# Patient Record
Sex: MALE | Race: WHITE | URBAN - METROPOLITAN AREA
[De-identification: names, ages, dates, MRNs, and addresses within clinical notes are randomized per-mention and may not be internally consistent; named-entity substitution may affect disease eponyms.]

---

## 2024-03-26 ENCOUNTER — TELEPHONE (OUTPATIENT)
Dept: NEUROLOGY | Facility: CLINIC | Age: 64
End: 2024-03-26
Payer: MEDICARE

## 2024-03-26 NOTE — TELEPHONE ENCOUNTER
----- Message from Reyna Bermudez sent at 3/26/2024  3:10 PM CDT -----  Regarding: Sooner Appointment Request  Contact: patient at [phone]  Type:  Sooner Appointment Request    Name of Caller:  patient at [phone]    Symptoms:  MS diagnosis    Additional Information:  Please call and advise. Thank you

## 2024-04-11 NOTE — TELEPHONE ENCOUNTER
Returned pt's call. Pt stated he was diagnosed with MS approx 10 years ago and the doctor he was following with retired. He was displeased with the neurologist he was deferred to in the same group so he would like to get established with a new provider. This Rn made pt aware we will need MRI reports and most recent neurology progress notes in order to consider for scheduling. Pt voiced understanding. Fax # provided. Pt is in no distress and stated he is not on DMTs.